# Patient Record
Sex: FEMALE | Employment: UNEMPLOYED | URBAN - METROPOLITAN AREA
[De-identification: names, ages, dates, MRNs, and addresses within clinical notes are randomized per-mention and may not be internally consistent; named-entity substitution may affect disease eponyms.]

---

## 2017-01-13 ENCOUNTER — HOSPITAL ENCOUNTER (OUTPATIENT)
Dept: NUTRITION | Age: 37
Discharge: HOME | End: 2017-01-13
Payer: COMMERCIAL

## 2017-01-13 PROCEDURE — 97802 MEDICAL NUTRITION INDIV IN: CPT

## 2017-01-13 NOTE — PROGRESS NOTES
[facility]  [address], [address] [place] [address]  Phone: [phone]  Fax: [phone]   Nutrition Assessment  Medical Nutrition Therapy   Outpatient Initial Evaluation         Patient Name: Valerie Romo : 1980   Treatment Diagnosis: Obesity Onset Date:    Referral Source: Cindi Gutierrez MD Start of Care [facility]): 2017     Ht:  68 in  cm Wt: 234.6 lb  kg   BMI: 35.7  BMR   male  BMR female 1     PMHx includes:      Medications of Nutritional Significance:   levothyroxine     Subjective/Assessment:   28YO female referred to RD for obesity. Per BMI, pt is considered obese class 2. Per reports she used to weigh 160-170 lbs, but then gradually gained weight as she stopped exercising and started having problems with her thyroid. She was diagnosed with thyroid cancer in May 2016 and shortly after had her thyroid removed. Before that surgery she weighed 220 lbs, but has continued to gain up to her current weight. She just started exercising again 2.5 months ago, going to the gym for 2 hours, 3 days per week (cardio and strength training). This exercise has halted her wt gain, but she has not been able to lose any thus far. She lives with her 8YO son and works FT; her job is somewhat active. Pt expressed comprehension of education and feels like she is already following advice given; expect fair-good compliance. Current Eating Patterns: Per diet recall, appears pt's meal timing is appropriate, but meals are no always balanced. B-fast usually consists of all carbs (which then makes her starving before lunch because it digests too quickly and doesn't keep her full). For example, b-fast is maple & brown sugar oatmeal with 2% milk and coffee with 1 tbsp sugar. Then she has an apple or banana for a morning snack. Lunch and dinner meals consist of a protein and carbs (sometimes too many carbs), but lack vegetables and fruit.  Plus, pt will go back for second helpings at dinner sometimes, likely causing excessive calorie intake. She avoids sugary beverages, enjoys 2 glasses of wine occasionally and does not snack after dinner. Handouts/  Information Provided: [x]  Carbohydrates  [x]  Protein  []  Fiber  [x]  Serving Sizes  [x]  Snack Ideas  []  Food Journals []  Diabetes  []  Cholesterol  []  Sodium  [x]  Gen Nutr Guidelines  []  SBGM Guidelines  [x]  Others: List of non-starchy vegetables  Discussed the Healthy Plate Method and appropriate portion sizes of different food groups. Explained the importance of combining carbohydrates with protein at meals and reviewed foods that contain each nutrient. Explained calorie density and empty calories to assist pt with understanding portion sizes and limiting excess calories. Estimate Needs:   Calories:  1800 Protein: 113 Carbs: 203 Fat: 60   Kcal/day  g/day  g/day  g/day        percent: 25  45  30     Nutritional Goal - To promote lifestyle changes to result in:    [x]  Weight loss  []  Improved diabetic control  []  Decreased cholesterol levels  []  Decreased blood pressure  []  Weight maintenance []  Preventing any interactions associated with food allergies  []  Adequate weight gain toward goal weight  []  Other:        Recommendations: - Pt will combine carbohydrates with a protein source at every meal and snack.  - Pt will add a protein source (eggs, turkey deluca or sausage, [place] deluca) to b-fast meal and eliminate mi-morning snack.  - Pt will eat within 2 hours of waking and eat a meal every 4-5 hours while awake. If longer than 5 hours between meals, pt will have a snack. Avoid skipping meals. - If pt is not full from meal, then pt will focus on eating non-starchy vegetables or protein to fill up on.      Information Reviewed with: pt   Potential Barriers to Learning: none     Dietitian Signature: Anca Encinas RD Date: 1/13/2017   Follow-up: Fri, 2/24/17@ 10am Time: 10:57 AM